# Patient Record
Sex: MALE | Race: WHITE | NOT HISPANIC OR LATINO | URBAN - METROPOLITAN AREA
[De-identification: names, ages, dates, MRNs, and addresses within clinical notes are randomized per-mention and may not be internally consistent; named-entity substitution may affect disease eponyms.]

---

## 2022-06-09 ENCOUNTER — EMERGENCY (EMERGENCY)
Facility: HOSPITAL | Age: 52
LOS: 1 days | Discharge: HOME | End: 2022-06-09
Attending: EMERGENCY MEDICINE
Payer: COMMERCIAL

## 2022-06-09 VITALS
RESPIRATION RATE: 19 BRPM | DIASTOLIC BLOOD PRESSURE: 87 MMHG | TEMPERATURE: 98 F | OXYGEN SATURATION: 98 % | SYSTOLIC BLOOD PRESSURE: 150 MMHG | HEART RATE: 92 BPM

## 2022-06-09 VITALS
TEMPERATURE: 98 F | RESPIRATION RATE: 20 BRPM | SYSTOLIC BLOOD PRESSURE: 165 MMHG | WEIGHT: 154.98 LBS | HEART RATE: 112 BPM | DIASTOLIC BLOOD PRESSURE: 105 MMHG | HEIGHT: 70 IN | OXYGEN SATURATION: 98 %

## 2022-06-09 PROCEDURE — 90471 IMMUNIZATION ADMIN: CPT

## 2022-06-09 PROCEDURE — 99284 EMERGENCY DEPT VISIT MOD MDM: CPT

## 2022-06-09 PROCEDURE — 96372 THER/PROPH/DIAG INJ SC/IM: CPT | Mod: XU

## 2022-06-09 PROCEDURE — 99285 EMERGENCY DEPT VISIT HI MDM: CPT | Mod: 25

## 2022-06-09 PROCEDURE — 90715 TDAP VACCINE 7 YRS/> IM: CPT

## 2022-06-09 PROCEDURE — 13152 CMPLX RPR E/N/E/L 2.6-7.5 CM: CPT

## 2022-06-09 RX ORDER — KETOROLAC TROMETHAMINE 30 MG/ML
30 SYRINGE (ML) INJECTION ONCE
Refills: 0 | Status: DISCONTINUED | OUTPATIENT
Start: 2022-06-09 | End: 2022-06-09

## 2022-06-09 RX ORDER — LIDOCAINE/EPINEPHR/TETRACAINE 4-0.09-0.5
1 GEL WITH PREFILLED APPLICATOR (ML) TOPICAL ONCE
Refills: 0 | Status: COMPLETED | OUTPATIENT
Start: 2022-06-09 | End: 2022-06-09

## 2022-06-09 RX ORDER — TETANUS TOXOID, REDUCED DIPHTHERIA TOXOID AND ACELLULAR PERTUSSIS VACCINE, ADSORBED 5; 2.5; 8; 8; 2.5 [IU]/.5ML; [IU]/.5ML; UG/.5ML; UG/.5ML; UG/.5ML
0.5 SUSPENSION INTRAMUSCULAR ONCE
Refills: 0 | Status: COMPLETED | OUTPATIENT
Start: 2022-06-09 | End: 2022-06-09

## 2022-06-09 RX ADMIN — TETANUS TOXOID, REDUCED DIPHTHERIA TOXOID AND ACELLULAR PERTUSSIS VACCINE, ADSORBED 0.5 MILLILITER(S): 5; 2.5; 8; 8; 2.5 SUSPENSION INTRAMUSCULAR at 02:40

## 2022-06-09 RX ADMIN — Medication 1 TABLET(S): at 05:33

## 2022-06-09 RX ADMIN — Medication 1 APPLICATION(S): at 02:40

## 2022-06-09 RX ADMIN — Medication 30 MILLIGRAM(S): at 02:44

## 2022-06-09 NOTE — ED ADULT NURSE NOTE - CAS ELECT INFOMATION PROVIDED
FOllow up with plastics within 7-10 days. take antibiotics as prescribed and finish course/DC instructions

## 2022-06-09 NOTE — ED PROVIDER NOTE - CLINICAL SUMMARY MEDICAL DECISION MAKING FREE TEXT BOX
53 yo M w/ no significant PMHx presenting to ED for c/o lower lip injury during hockey. Hit with stick. No LOC. Denies tooth injury, jaw pain, change in bite. No LOC, NA, or N/V. No other head injury. Tetanus not UTD. Exam as above. Lac to lower lip. Plastics for repair.

## 2022-06-09 NOTE — ED PROVIDER NOTE - PHYSICAL EXAMINATION
Gen: A&Ox4   HEENT: 2.5 cm linear lac to lower lip not crossing vermilion border. Head otherwise atraumatic. Mucous membranes moist, no scleral icterus.  CV: RRR. No significant lower extremity edema.   Resp: Respirations unlabored. CTAB, no rales, no wheezes.  GI: Abdomen non tender to palpation, soft and non-distended.   Skin/MSK: No open wounds. No ecchymosis appreciated. No midline C spine ttp.   Neuro: Following commands. EOMI. Pupils ERRL.  Psych: Appropriate mood, cooperative

## 2022-06-09 NOTE — CONSULT NOTE ADULT - SUBJECTIVE AND OBJECTIVE BOX
51 yo M w/ no significant PMHx presenting to ED for c/o lower lip injury during hockey. Hit with stick by his opponent No LOC. Denies tooth injury, jaw pain, change in bite. No LOC, NA, or N/V. No other head injury. Tetanus not UTD. Trauma occurred at 11pm. He did not wash the wound out and presented to the ED for management of his injuries    PMH: asthma  PSH: none  ALL: seasonal  Meds: albuterol  Social: no tobacco use, occasional EtOH, works as a metal     Exam: laceration of lower lip L shaped in nature extending from the right commisure dry vermillion extending longitudinally towards the left commisure along the dry vermillion. Laceration measures 3.2cm in size. Laceration does go through the orbicularis oris without visible labial artery. FROM off facial mimetic muscles, occlusion intact, V1-3 intact. No tenderness to palpation along orbital, frontal, maxillary, mandibular, nasal and zygomatic bones.

## 2022-06-09 NOTE — ED PROVIDER NOTE - CARE PROVIDER_API CALL
Matthieu Sanders)  Surgery  [address], [address]  [place] [address]  Phone: [phone]  Fax: [phone]  Follow Up Time: 7-10 Days

## 2022-06-09 NOTE — ED PROVIDER NOTE - PROGRESS NOTE DETAILS
Diogenes Douglas PGY2: Spoke with plastics, they will see pt in ~1 hour. Diogenes Douglas PGY2: Spoke with plastics, they will see pt in ~1 hour. Matt Cao MD, FACEP also spoke to Dr. SUBHA Sanders requesting consult for lip laceration for cosmesis as team feels it requires plastic surgery. iDogenes Douglas PGY2: Laceration repaired by plastics. Pt given DC instructions and instructions for f/u. Pt in agreement with plan.

## 2022-06-09 NOTE — ED PROVIDER NOTE - PATIENT PORTAL LINK FT
You can access the FollowMyHealth Patient Portal offered by [facility] by registering at the following website: [email or web address]. By joining [employer]’s FollowMyHealth portal, you will also be able to view your health information using other applications (apps) compatible with our system.

## 2022-06-09 NOTE — ED PROVIDER NOTE - OBJECTIVE STATEMENT
51 yo M w/ no significant PMHx presenting to ED for c/o lower lip injury during hockey. Hit with stick. No LOC. Denies tooth injury, jaw pain, change in bite. No LOC, NA, or N/V. No other head injury. Tetanus not UTD.

## 2022-06-09 NOTE — CONSULT NOTE ADULT - ASSESSMENT
53yo s/p complex closure of his lower lip laceration  - po abx 5 days  - tetanus vaccination  - ice intermittent for 24 hrs  - head of bed elevated  - no eating of foods in which he needs to open his mouth wide    dictation#  53yo s/p complex closure of his lower lip laceration  - po abx 5 days  - tetanus vaccination  - ice intermittent for 24 hrs  - head of bed elevated  - no eating of foods in which he needs to open his mouth wide    dictation# [id number]

## 2022-06-09 NOTE — ED PROVIDER NOTE - NS ED ROS FT
Gen: Denies fever.   HEENT: Denies headache. Denies congestion.  CV: Denies chest pain. Denies lightheadedness.  Skin: Denies rash. +wound  Resp: Denies SOB. Denies cough.  GI: Denies abd pain. Denies nausea. Denies vomiting. Denies diarrhea. Denies melena. Denies hematochezia.  Msk: Denies extremity swelling. Denies extremity pain.  : Denies dysuria. Denies hematuria.  Neuro: Denies LOC. Denies dizziness. Denies new numbness/tingling. Denies new focal weakness.  Psych: Denies SI

## 2022-06-09 NOTE — ED PROVIDER NOTE - NSFOLLOWUPINSTRUCTIONS_ED_ALL_ED_FT
1. You presented to the emergency department for:  laceration    2. Your evaluation in the emergency department included a physician evaluation. Your work-up did not reveal any findings indicating the need for admission to the hospital or any emergent interventions at this time.     3. It is recommended that you follow-up with your primary care doctor or plastic surgery for suture removal as discussed for a repeat evaluation, and potentially further testing and treatment.     If needed, to arrange an appointment with a primary care provider please call: [phone]    4. Please continue taking your regular medications as prescribed.     For pain you may take 500-1000mg Tylenol every 8 hours - as needed.  This is an over-the-counter medication - please read the instructions for use and warnings on the label. If you have any questions regarding its use, you may refer them to your local pharmacist.    5. PLEASE RETURN TO THE EMERGENCY DEPARTMENT IMMEDIATELY IF you develop any fevers not responding to over the counter medications, signs of wound infection, uncontrollable nausea and vomiting, an inability to tolerate eating and drinking, difficulty breathing, chest pain, a severe increase in your symptoms or pain, or any other new symptoms that concern you. 1. You presented to the emergency department for:  laceration    2. Your evaluation in the emergency department included a physician evaluation. Your work-up did not reveal any findings indicating the need for admission to the hospital or any emergent interventions at this time.     3. It is recommended that you follow-up with your primary care doctor or plastic surgery for suture removal as discussed for a repeat evaluation, and potentially further testing and treatment.     If needed, to arrange an appointment with a primary care provider please call: [phone]    4. Please continue taking your regular medications as prescribed.     For pain you may take 500-1000mg Tylenol every 8 hours - as needed.  This is an over-the-counter medication - please read the instructions for use and warnings on the label. If you have any questions regarding its use, you may refer them to your local pharmacist.    You can use 400-600mg Ibuprofen (such as motrin or advil) every 6 to 8 hours as needed for pain control.  Take ibuprofen with food or milk to lessen stomach upset.  This is an over-the-counter medication please respect the warnings on the label. All medications come with certain risks and side effects that you need to discuss with your doctor, especially if you are taking them for a prolonged period (beyond 3-4 days).    5. PLEASE RETURN TO THE EMERGENCY DEPARTMENT IMMEDIATELY IF you develop any fevers not responding to over the counter medications, signs of wound infection, uncontrollable nausea and vomiting, an inability to tolerate eating and drinking, difficulty breathing, chest pain, a severe increase in your symptoms or pain, or any other new symptoms that concern you.

## 2022-06-09 NOTE — ED PROVIDER NOTE - ATTENDING CONTRIBUTION TO CARE
Matt Cao MD, FACEP: In this physician's medical judgement based on clinical history and physical exam: patient with lower lip injury while playing hockey.  patient states he was struck in the lip by a stick  no loss of consciousness  pleasant  trachea midline  cooperative  non-tachycardic  non-tachypneic  no gross deformity of extremities, no asymmetry  will consult plastic surgery, wound should be approximated by plastic surgery, ***Matt Cao MD*** is requesting plastic surgery secondary to location and cosmetic benefit of plastic surgery, the patient does not request plastics and this is done at the expertise of the emergency medicine doctor, give tdap and reassess.   The patient was serially evaluated throughout emergency department course by the team. There was no acute deterioration up to this time in the emergency department. The patient has demonstrated clinical improvement and/or stability, feels better at this time according to emergency department team. Agree with goals/plan of emergency department care as described in this physician's electronic medical record, including diagnostics, therapeutics and consultation recommendation as clinically warranted. Will discharge home with close outpatient follow up with primary care physician/provider and specialist if necessary. The patient and/or family was educated on expectant management and return precautions concerning signs and features to return to the emergency department, in layman terms, including but not limited to: nausea, vomiting, fever, chills, the inability to eat, take medications, or drink, persistent/worsening symptoms or any concerns at all. There are no acute or immediate life threatening issues present on history, clinical exam, or any diagnostic evaluation. The patient is a safe disposition home, has capacity and insight into their condition, is ambulatory in the Emergency Department with no further questions and will follow up with their doctor(s) this week. Diagnosis, prognosis, natural history and treatment was discussed with patient and/or family. The patient and/or family were given the opportunity to ask questions and have them answered in full. The patient and/or family are with capacity and insight into the situation, treatment, risks, benefits, alternative therapies, and understand that they can ask any further questions if needed. Patient and/or family/guardian understands anticipatory guidance and was given strict return and follow up precautions. The patient and/or family/guardian has been informed of the necessity to follow up with the PMD/Clinic/follow up as provided within 2-3 days, and the patient and/or family/guardian reports understanding of above with capacity and insight. The patient and/or family/guardian were informed of any results of their tests and are were encouraged to follow up on the findings with their doctor as well as the need to inform their doctor of any results. The patient and/or family/guardian are aware of the need to follow up with repeat testing as applicable and report understanding of the above with capacity and insight. The patient and/or family/guardian was made aware of any pending test results at the time of discharge and of the need to call back for the final results a well as the need to inform their doctor of the results.

## 2022-06-09 NOTE — ED ADULT NURSE NOTE - OBJECTIVE STATEMENT
53yo M with no PMH presents to ED c/o lip laceration. Pt states, "I was playing ice hockey tonight, and I got hit in the mouth. I was wearing a mouth piece but not a full face shield. I did not lose consciousness, I remember everything." Denies chest pain, SOB, weakness, lightheadedness, blurred/changes in vision, N/V, LOC, blood thinner use. Pt has not other complaints at this time. A&Ox3. Strong peripheral pulses. Neurologically intact, and follows commands. Pt applying direct pressure to lip, bleeding controlled, ice pack applied to lip. No other obvious deformities on inspection of pt. Plastics paged for repair. Pt sitting comfortably in stretcher. Ambulatory with steady gait in ED. Stretcher locked and in lowest position, appropriate side rails up. Pt instructed to notify RN if assistance is needed.